# Patient Record
Sex: MALE | Race: BLACK OR AFRICAN AMERICAN | Employment: FULL TIME | URBAN - METROPOLITAN AREA
[De-identification: names, ages, dates, MRNs, and addresses within clinical notes are randomized per-mention and may not be internally consistent; named-entity substitution may affect disease eponyms.]

---

## 2019-08-25 ENCOUNTER — HOSPITAL ENCOUNTER (EMERGENCY)
Age: 49
Discharge: HOME | End: 2019-08-25
Attending: EMERGENCY MEDICINE

## 2019-08-25 VITALS
TEMPERATURE: 97.9 F | HEIGHT: 69 IN | SYSTOLIC BLOOD PRESSURE: 158 MMHG | RESPIRATION RATE: 16 BRPM | WEIGHT: 265 LBS | HEART RATE: 50 BPM | BODY MASS INDEX: 39.25 KG/M2 | DIASTOLIC BLOOD PRESSURE: 89 MMHG | OXYGEN SATURATION: 98 %

## 2019-08-25 DIAGNOSIS — K08.89 PAIN, DENTAL: Primary | ICD-10-CM

## 2019-08-25 NOTE — ED PROVIDER NOTES
EMERGENCY DEPARTMENT HISTORY AND PHYSICAL EXAM      Date: 8/25/2019  Patient Name: Maria Essex    History of Presenting Illness     Chief Complaint   Patient presents with    Dental Pain       History (Context): Maria Essex is a 54 y.o. gentleman who presents with several days of gradual onset, progressive, severe, localized gingival pain. There are no exacerbating/relieving features or other associated symptoms. On review of systems, the patient denies fever, chills, throat pain, chest pain. PCP: Unknown, Provider        Past History     Past Medical History:  Past Medical History:   Diagnosis Date    Hypertension        Past Surgical History:  History reviewed. No pertinent surgical history. Family History:  History reviewed. No pertinent family history. Social History:  Social History     Tobacco Use    Smoking status: Current Some Day Smoker    Smokeless tobacco: Never Used   Substance Use Topics    Alcohol use: Never     Frequency: Never    Drug use: Never       Allergies:  No Known Allergies    PMH, PSH, family history, social history, allergies reviewed with the patient with significant items noted above. Review of Systems   As per HPI, otherwise reviewed and negative. Physical Exam     Vitals:    08/25/19 0253 08/25/19 0539   BP: (!) 162/96 158/89   Pulse: (!) 51 (!) 50   Resp: 16 16   Temp: 97.9 °F (36.6 °C)    SpO2: 98% 98%   Weight: 120.2 kg (265 lb)    Height: 5' 9\" (1.753 m)        Gen: Well-appearing, in ear pain  HEENT: Normocephalic, sclera anicteric, normal dentition, pain around tooth number 19, no pain with percussion of this tooth, no sinus tract noted, pronounced gingival inflammation, no lymphadenopathy  Cardiovascular: Normal rate, regular rhythm, no murmurs, rubs, gallops. Pulses intact and equal distally. Pulmonary: No respiratory distress. No stridor. Clear lungs. ABD: Soft, nontender, nondistended. Neuro: Alert. Normal speech.   Normal mentation. Psych: Normal thought content and thought processes. : No CVA tenderness  EXT: Moves all extremities well. No cyanosis or clubbing. Skin: Warm and well-perfused. Diagnostic Study Results     Labs -   No results found for this or any previous visit (from the past 12 hour(s)). Radiologic Studies -   No orders to display     CT Results  (Last 48 hours)    None        CXR Results  (Last 48 hours)    None            Medical Decision Making   I am the first provider for this patient. I reviewed the vital signs, available nursing notes, past medical history, past surgical history, family history and social history. Vital Signs-Reviewed the patient's vital signs. Records Reviewed: Personally, on initial evaluation    MDM:   Patient presents with dental pain. Exam significant for normal dentition, nontender to percussion at tooth number 19, no sinus tract, no cellulitis, positive gingival inflammation, no drainable fluid collection. DDX considered: Pulpitis, gingivitis,   DDX thought to be less likely but also considered due to high risk condition: Israel's angina, peritonitis, periapical abscess,    Patient condition on initial evaluation: Stable    Plan:   Pain Control         ED Course:   Talked the patient regarding using eugenol at home. Patient seems eager to leave the department and get eugenol. He will be discharged at this time. He has good dental follow-up. Patient condition at time of disposition: Stable  DISCHARGE NOTE:   Patient will need outpatient dental care. Care plan outlined and precautions discussed. All medications were reviewed with the patient; will d/c home with eugenol. All of pt's questions and concerns were addressed. Alarm symptoms and return precautions associated with chief complaint and evaluation were reviewed with the patient in detail. The patient demonstrated adequate understanding.   Patient was instructed and agrees to follow up with dentist, as well as to return to the ED upon further deterioration. Patient is ready to go home. The patient is very happy with this plan    Follow-up Information     Follow up With Specialties Details Why Contact Info    A dentist within 1 week   As needed, If symptoms worsen                Disposition: Discharge home    Diagnosis     Clinical Impression:   1. Pain, dental        Signed,  Randy Cockayne, MD  Emergency Physician  [facility]    As a voice dictation software was utilized to dictate this note, minor word transpositions can occur. I apologize for confusing wording and typographic errors. Please feel free to contact me for clarification.

## 2019-08-25 NOTE — DISCHARGE INSTRUCTIONS
Patient Education        Periodontal Conditions: Care Instructions  Your Care Instructions    Periodontal conditions affect the gums, bone, and tissue that surround and support the teeth. The most common problems are caused by plaque. Plaque is a thin film of bacteria that sticks to teeth above and below the gum line. It can build up and harden into tartar. The bacteria in plaque and tartar can cause gum disease. Gingivitis is a disease that affects the gums (gingiva). The gums are the soft tissue that surrounds the teeth. Gingivitis causes red, swollen, tender gums that bleed easily when brushed, persistent bad breath, and sensitive teeth. Because it is not painful, many people do not get treatment when they should. Gingivitis can be reversed with good dental care. Periodontitis is a more advanced disease that affects more than the gums. The gums pull away from the teeth. This leaves deep pockets where bacteria can grow. The disease can damage the bones that support the teeth. The teeth may get loose and fall out. A periodontal condition should be treated as soon as it is found. Finding gum problems early, treating them right away, and having regular checkups bring the best results. You can treat mild periodontal conditions by brushing and flossing your teeth every day. Your dentist may prescribe a mouthwash to kill the bacteria that can damage teeth and gums. Your dentist may have you take antibiotics to treat infection from moderate periodontal disease. If your gums have pulled away from your teeth, you may need cleaning between the teeth and gums right down to the teeth roots. This is called root planing and scaling. If you have severe periodontal disease, you may need surgery to remove diseased gum tissue or repair bone damage. Follow-up care is a key part of your treatment and safety. Be sure to make and go to all appointments, and call your dentist if you are having problems.  It's also a good idea to know your test results and keep a list of the medicines you take. How can you care for yourself at home? · If your dentist prescribed antibiotics, take them as directed. Do not stop taking them just because you feel better. You need to take the full course of antibiotics. · Brush your teeth twice a day, in the morning and at night. ? Use a toothbrush with soft, rounded-end bristles and a head that is small enough to reach all parts of your teeth and mouth. Replace your toothbrush every 3 to 4 months. ? Use a fluoride toothpaste. ? Place the brush at a 45-degree angle where the teeth meet the gums. Press firmly, and gently rock the brush back and forth using small circular movements. ? Brush chewing surfaces vigorously with short back-and-forth strokes. ? Brush your tongue from back to front. · Floss at least once a day. Choose the type and flavor that you like best.  · Have your teeth cleaned by a professional at least twice a year. · Ask your dentist about using an antibacterial mouthwash to help reduce bacteria. · Rinse your mouth with water or chew sugar-free gum after meals if you can't brush your teeth. · Do not smoke or use smokeless tobacco. Tobacco use can cause periodontal disease. When should you call for help? Call your dentist now or seek immediate medical care if:    · You have symptoms of infection, such as:  ? Increased pain, swelling, warmth, or redness. ? Red streaks leading from the area. ? Pus draining from the area. ? A fever.    Watch closely for changes in your health, and be sure to contact your dentist if:    · You have new or worse tooth pain.     · You do not get better as expected. Where can you learn more? Go to http://kitty-larry.info/. Enter Y598 in the search box to learn more about \"Periodontal Conditions: Care Instructions. \"  Current as of: October 3, 2018  Content Version: 12.1  © 2322-8039 [employer].  Care instructions adapted under license by [employer] (which disclaims liability or warranty for this information). If you have questions about a medical condition or this instruction, always ask your healthcare professional. [place] [address] any warranty or liability for your use of this information. Patient Education        Tooth and Gum Pain in Children: Care Instructions  Your Care Instructions    The most common causes of dental pain are tooth decay and gum disease. Pain can also be caused by an infection of the tooth (abscess) or the gums. Or your child may have a broken or cracked tooth. Other causes of pain include infection and damage to a tooth from grinding the teeth. A tooth that is coming in but cannot break through the gum can cause pain. Prompt dental care can help find the cause of your child's toothache and keep the tooth from dying or gum disease from getting worse. Care at home may reduce your child's pain and discomfort. Follow-up care is a key part of your child's treatment and safety. Be sure to make and go to all appointments, and call your dentist or doctor if your child is having problems. It's also a good idea to know your child's test results and keep a list of the medicines your child takes. How can you care for your child at home? · To reduce pain and facial swelling, put ice or a cold pack on the outside of your child's cheek for 10 to 20 minutes at a time. Put a thin cloth between the ice and your child's skin. Do not use heat. · If the doctor prescribed antibiotics for your child, give them as directed. Do not stop using them just because your child feels better. Your child needs to take the full course of antibiotics. · Give your child anti-inflammatory medicines such as ibuprofen (Advil, Motrin) to reduce pain and swelling. Be safe with medicines. Read and follow all instructions on the label.   · Do not give your child very hot, cold, or sweet foods and drinks if they increase pain. · Rinse your child's mouth with warm salt water every 2 hours to help relieve pain and swelling. Older children (starting around age 6) can do this by themselves. Mix 1 teaspoon of salt in 8 ounces of water. · Talk to your dentist about your child using special toothpaste for sensitive teeth. To reduce pain on contact with heat or cold or when brushing, have your child brush with this toothpaste regularly or rub a small amount of the paste on the sensitive area with a clean finger 2 or 3 times a day. Floss gently between your child's teeth. When should you call for help? Call 911 anytime you think your child may need emergency care. For example, call if:    · Your child has trouble breathing.    Call your dentist or doctor now or seek immediate medical care if:    · Your child has signs of infection, such as:  ? Increased pain, swelling, warmth, or redness. ? Red streaks leading from the area. ? Pus draining from the area. ? A fever.    Watch closely for changes in your child's health, and be sure to contact your doctor if:    · Your child does not get better as expected. Where can you learn more? Go to http://kitty-larry.info/. Enter J245 in the search box to learn more about \"Tooth and Gum Pain in Children: Care Instructions. \"  Current as of: October 3, 2018  Content Version: 12.1  © 4301-1818 [employer]. Care instructions adapted under license by [employer] (which disclaims liability or warranty for this information). If you have questions about a medical condition or this instruction, always ask your healthcare professional. [place] [address] any warranty or liability for your use of this information.